# Patient Record
(demographics unavailable — no encounter records)

---

## 2025-01-27 NOTE — HISTORY OF PRESENT ILLNESS
[FreeTextEntry1] : 65 yo pt here for annual exam..  daughter  in sept.   denies vb, dc, pain, bladder problems./  enalapril farxiga crestor basa insurance changed.